# Patient Record
Sex: MALE | Race: WHITE | URBAN - METROPOLITAN AREA
[De-identification: names, ages, dates, MRNs, and addresses within clinical notes are randomized per-mention and may not be internally consistent; named-entity substitution may affect disease eponyms.]

---

## 2022-01-01 ENCOUNTER — HOSPITAL ENCOUNTER (EMERGENCY)
Age: 57
End: 2022-06-14
Attending: EMERGENCY MEDICINE | Admitting: EMERGENCY MEDICINE

## 2022-01-01 VITALS — SYSTOLIC BLOOD PRESSURE: 56 MMHG | DIASTOLIC BLOOD PRESSURE: 36 MMHG

## 2022-01-01 DIAGNOSIS — I46.9 CARDIAC ARREST (HCC): Primary | ICD-10-CM

## 2022-01-01 LAB
ALBUMIN SERPL-MCNC: 3.2 G/DL (ref 3.5–5)
ALBUMIN/GLOB SERPL: 1 {RATIO} (ref 1.1–2.2)
ALP SERPL-CCNC: 68 U/L (ref 45–117)
ALT SERPL-CCNC: 131 U/L (ref 12–78)
ANION GAP SERPL CALC-SCNC: 15 MMOL/L (ref 5–15)
AST SERPL-CCNC: 135 U/L (ref 15–37)
ATRIAL RATE: 39 BPM
BASE DEFICIT BLD-SCNC: 14 MMOL/L
BASOPHILS # BLD: 0 K/UL (ref 0–0.1)
BASOPHILS NFR BLD: 0 % (ref 0–1)
BILIRUB SERPL-MCNC: 0.5 MG/DL (ref 0.2–1)
BUN SERPL-MCNC: 21 MG/DL (ref 6–20)
BUN/CREAT SERPL: 15 (ref 12–20)
CA-I BLD-MCNC: 1.01 MMOL/L (ref 1.12–1.32)
CALCIUM SERPL-MCNC: 10.8 MG/DL (ref 8.5–10.1)
CALCULATED R AXIS, ECG10: -102 DEGREES
CALCULATED T AXIS, ECG11: -49 DEGREES
CHLORIDE BLD-SCNC: 113 MMOL/L (ref 100–108)
CHLORIDE SERPL-SCNC: 106 MMOL/L (ref 97–108)
CO2 BLD-SCNC: 20 MMOL/L (ref 19–24)
CO2 SERPL-SCNC: 20 MMOL/L (ref 21–32)
COMMENT, HOLDF: NORMAL
CREAT SERPL-MCNC: 1.43 MG/DL (ref 0.7–1.3)
CREAT UR-MCNC: 1.3 MG/DL (ref 0.6–1.3)
D DIMER PPP FEU-MCNC: 4.18 MG/L FEU (ref 0–0.65)
DIAGNOSIS, 93000: NORMAL
DIFFERENTIAL METHOD BLD: ABNORMAL
EOSINOPHIL # BLD: 0.6 K/UL (ref 0–0.4)
EOSINOPHIL NFR BLD: 6 % (ref 0–7)
ERYTHROCYTE [DISTWIDTH] IN BLOOD BY AUTOMATED COUNT: 12.7 % (ref 11.5–14.5)
GLOBULIN SER CALC-MCNC: 3.1 G/DL (ref 2–4)
GLUCOSE BLD STRIP.AUTO-MCNC: 224 MG/DL (ref 65–117)
GLUCOSE BLD STRIP.AUTO-MCNC: 233 MG/DL (ref 74–106)
GLUCOSE SERPL-MCNC: 257 MG/DL (ref 65–100)
HCO3 BLDA-SCNC: 18 MMOL/L
HCT VFR BLD AUTO: 48.9 % (ref 36.6–50.3)
HGB BLD-MCNC: 15.6 G/DL (ref 12.1–17)
IMM GRANULOCYTES # BLD AUTO: 0 K/UL
IMM GRANULOCYTES NFR BLD AUTO: 0 %
LACTATE BLD-SCNC: 6.13 MMOL/L (ref 0.4–2)
LYMPHOCYTES # BLD: 4.3 K/UL (ref 0.8–3.5)
LYMPHOCYTES NFR BLD: 44 % (ref 12–49)
MCH RBC QN AUTO: 29.8 PG (ref 26–34)
MCHC RBC AUTO-ENTMCNC: 31.9 G/DL (ref 30–36.5)
MCV RBC AUTO: 93.5 FL (ref 80–99)
MONOCYTES # BLD: 1 K/UL (ref 0–1)
MONOCYTES NFR BLD: 10 % (ref 5–13)
NEUTS SEG # BLD: 3.9 K/UL (ref 1.8–8)
NEUTS SEG NFR BLD: 40 % (ref 32–75)
NRBC # BLD: 0.03 K/UL (ref 0–0.01)
NRBC BLD-RTO: 0.3 PER 100 WBC
PCO2 BLDV: 71.3 MMHG (ref 41–51)
PH BLDV: 7.02 [PH] (ref 7.32–7.42)
PLATELET # BLD AUTO: 211 K/UL (ref 150–400)
PMV BLD AUTO: 11.1 FL (ref 8.9–12.9)
PO2 BLDV: 25 MMHG (ref 25–40)
POTASSIUM BLD-SCNC: 3.1 MMOL/L (ref 3.5–5.5)
POTASSIUM SERPL-SCNC: 3.6 MMOL/L (ref 3.5–5.1)
PROT SERPL-MCNC: 6.3 G/DL (ref 6.4–8.2)
Q-T INTERVAL, ECG07: 444 MS
QRS DURATION, ECG06: 96 MS
QTC CALCULATION (BEZET), ECG08: 502 MS
RBC # BLD AUTO: 5.23 M/UL (ref 4.1–5.7)
RBC MORPH BLD: ABNORMAL
SAMPLES BEING HELD,HOLD: NORMAL
SERVICE CMNT-IMP: ABNORMAL
SODIUM BLD-SCNC: 136 MMOL/L (ref 136–145)
SODIUM SERPL-SCNC: 141 MMOL/L (ref 136–145)
SPECIMEN SITE: ABNORMAL
TROPONIN-HIGH SENSITIVITY: 207 NG/L (ref 0–76)
VENTRICULAR RATE, ECG03: 77 BPM
WBC # BLD AUTO: 9.8 K/UL (ref 4.1–11.1)

## 2022-01-01 PROCEDURE — 99285 EMERGENCY DEPT VISIT HI MDM: CPT

## 2022-01-01 PROCEDURE — 74011250636 HC RX REV CODE- 250/636: Performed by: EMERGENCY MEDICINE

## 2022-01-01 PROCEDURE — 36415 COLL VENOUS BLD VENIPUNCTURE: CPT

## 2022-01-01 PROCEDURE — 96368 THER/DIAG CONCURRENT INF: CPT

## 2022-01-01 PROCEDURE — 82962 GLUCOSE BLOOD TEST: CPT

## 2022-01-01 PROCEDURE — 96365 THER/PROPH/DIAG IV INF INIT: CPT

## 2022-01-01 PROCEDURE — 96375 TX/PRO/DX INJ NEW DRUG ADDON: CPT

## 2022-01-01 PROCEDURE — 93005 ELECTROCARDIOGRAM TRACING: CPT

## 2022-01-01 PROCEDURE — 31500 INSERT EMERGENCY AIRWAY: CPT

## 2022-01-01 PROCEDURE — 92950 HEART/LUNG RESUSCITATION CPR: CPT

## 2022-01-01 PROCEDURE — 84484 ASSAY OF TROPONIN QUANT: CPT

## 2022-01-01 PROCEDURE — 85379 FIBRIN DEGRADATION QUANT: CPT

## 2022-01-01 PROCEDURE — 82947 ASSAY GLUCOSE BLOOD QUANT: CPT

## 2022-01-01 PROCEDURE — 37195 THROMBOLYTIC THERAPY STROKE: CPT

## 2022-01-01 PROCEDURE — 80053 COMPREHEN METABOLIC PANEL: CPT

## 2022-01-01 PROCEDURE — 92960 CARDIOVERSION ELECTRIC EXT: CPT

## 2022-01-01 PROCEDURE — 74011000250 HC RX REV CODE- 250: Performed by: EMERGENCY MEDICINE

## 2022-01-01 PROCEDURE — 85025 COMPLETE CBC W/AUTO DIFF WBC: CPT

## 2022-01-01 RX ORDER — LIDOCAINE HCL/PF 100 MG/5ML
SYRINGE (ML) INTRAVENOUS
Status: COMPLETED | OUTPATIENT
Start: 2022-01-01 | End: 2022-01-01

## 2022-01-01 RX ORDER — CALCIUM CHLORIDE INJECTION 100 MG/ML
INJECTION, SOLUTION INTRAVENOUS
Status: COMPLETED | OUTPATIENT
Start: 2022-01-01 | End: 2022-01-01

## 2022-01-01 RX ORDER — AMIODARONE HYDROCHLORIDE 150 MG/3ML
INJECTION, SOLUTION INTRAVENOUS
Status: COMPLETED | OUTPATIENT
Start: 2022-01-01 | End: 2022-01-01

## 2022-01-01 RX ORDER — NOREPINEPHRINE BITARTRATE/D5W 8 MG/250ML
PLASTIC BAG, INJECTION (ML) INTRAVENOUS
Status: COMPLETED | OUTPATIENT
Start: 2022-01-01 | End: 2022-01-01

## 2022-01-01 RX ORDER — ATROPINE SULFATE 0.1 MG/ML
INJECTION INTRAVENOUS
Status: COMPLETED | OUTPATIENT
Start: 2022-01-01 | End: 2022-01-01

## 2022-01-01 RX ORDER — MAGNESIUM SULFATE HEPTAHYDRATE 40 MG/ML
INJECTION, SOLUTION INTRAVENOUS
Status: COMPLETED | OUTPATIENT
Start: 2022-01-01 | End: 2022-01-01

## 2022-01-01 RX ORDER — EPINEPHRINE 0.1 MG/ML
INJECTION INTRACARDIAC; INTRAVENOUS
Status: COMPLETED | OUTPATIENT
Start: 2022-01-01 | End: 2022-01-01

## 2022-01-01 RX ORDER — SODIUM BICARBONATE 1 MEQ/ML
SYRINGE (ML) INTRAVENOUS
Status: COMPLETED | OUTPATIENT
Start: 2022-01-01 | End: 2022-01-01

## 2022-01-01 RX ADMIN — EPINEPHRINE 1 MG: 0.1 INJECTION INTRACARDIAC; INTRAVENOUS at 10:04

## 2022-01-01 RX ADMIN — MAGNESIUM SULFATE 2 G: 2 INJECTION INTRAVENOUS at 09:43

## 2022-01-01 RX ADMIN — EPINEPHRINE 1 MG: 0.1 INJECTION INTRACARDIAC; INTRAVENOUS at 10:49

## 2022-01-01 RX ADMIN — ATROPINE SULFATE 1 MG: 0.1 INJECTION PARENTERAL at 10:14

## 2022-01-01 RX ADMIN — EPINEPHRINE 1 MG: 0.1 INJECTION INTRACARDIAC; INTRAVENOUS at 09:57

## 2022-01-01 RX ADMIN — Medication 75 MCG/MIN: at 10:14

## 2022-01-01 RX ADMIN — EPINEPHRINE 1 MG: 0.1 INJECTION INTRACARDIAC; INTRAVENOUS at 10:33

## 2022-01-01 RX ADMIN — EPINEPHRINE 1 MG: 0.1 INJECTION INTRACARDIAC; INTRAVENOUS at 09:33

## 2022-01-01 RX ADMIN — CALCIUM CHLORIDE 1 G: 100 INJECTION, SOLUTION INTRAVENOUS; INTRAVENTRICULAR at 09:37

## 2022-01-01 RX ADMIN — SODIUM BICARBONATE 50 MEQ: 84 INJECTION, SOLUTION INTRAVENOUS at 09:35

## 2022-01-01 RX ADMIN — EPINEPHRINE 1 MG: 0.1 INJECTION INTRACARDIAC; INTRAVENOUS at 09:38

## 2022-01-01 RX ADMIN — EPINEPHRINE 1 MG: 0.1 INJECTION INTRACARDIAC; INTRAVENOUS at 09:42

## 2022-01-01 RX ADMIN — LIDOCAINE HYDROCHLORIDE 100 MG: 20 INJECTION, SOLUTION INTRAVENOUS at 09:58

## 2022-01-01 RX ADMIN — SODIUM BICARBONATE 50 MEQ: 84 INJECTION, SOLUTION INTRAVENOUS at 09:52

## 2022-01-01 RX ADMIN — SODIUM BICARBONATE 50 MEQ: 84 INJECTION, SOLUTION INTRAVENOUS at 10:36

## 2022-01-01 RX ADMIN — AMIODARONE HYDROCHLORIDE 150 MG: 50 INJECTION, SOLUTION INTRAVENOUS at 09:40

## 2022-01-01 RX ADMIN — ALTEPLASE 50 MG: KIT at 09:46

## 2022-01-01 RX ADMIN — SODIUM CHLORIDE 1000 ML: 900 INJECTION, SOLUTION INTRAVENOUS at 09:35

## 2022-06-14 NOTE — ED NOTES
Attempted calling [employer], nobody able to take the call at this time. Secure voicemail left. Awaiting to hear back.

## 2022-06-14 NOTE — PROGRESS NOTES
Staff for request for support to family in Room ER 15. Patient  @ 200 am today. Patient daughter Gloria Domingo, 2 other daughters, ex-spouse, cousin, and roommate were present at bedside. Condolences given to family. Provided grief support/counseling, ministry of [facility], [facility]; facilitated respect for spiritual/cultural practice during hospitalization -  prayer for sick and dying. Please contact Spiritual Care for any further referrals. Visited by: Frank Felipe, [address] [place] [address] paging Service [phone])

## 2022-06-14 NOTE — ED TRIAGE NOTES
Pt arrived via EMS for witnessed cardiac arrest. Pt found wheezing and hunched over around 0900, pt became pulseless and bystander CPR started 0902. Pt received 3 shocks, 200J in field. 3 rounds of epi, 300mg amio.

## 2022-06-14 NOTE — PROGRESS NOTES
Spiritual Care Assessment/Progress Note  [facility]      NAME: Jaja Mcdonough      MRN: [id number]  AGE: 61 y.o. SEX: male  Alevism Affiliation: No preference   Language: English     2022     Total Time (in minutes): 51     Spiritual Assessment begun in [address] through conversation with:         []Patient        [] Family    [] Friend(s)        Reason for Consult: Crisis, Stemi,Death, ER     Spiritual beliefs: (Please include comment if needed)     [] Identifies with a gadiel tradition:         [] Supported by a gadiel community:            [] Claims no spiritual orientation:           [] Seeking spiritual identity:                [] Adheres to an individual form of spirituality:           [x] Not able to assess:                           Identified resources for coping:      [] Prayer                               [] Music                  [] Guided Imagery     [] Family/friends                 [] Pet visits     [] Devotional reading                         [x] Unknown     [] Other:                                               Interventions offered during this visit: (See comments for more details)                Plan of Care:     [] Support spiritual and/or cultural needs    [] Support AMD and/or advance care planning process      [x] Support grieving process   [] Coordinate Rites and/or Rituals    [] Coordination with community clergy   [] No spiritual needs identified at this time   [] Detailed Plan of Care below (See Comments)  [] Make referral to Music Therapy  [] Make referral to Pet Therapy     [] Make referral to Addiction services  [] Make referral to [facility]  [] Make referral to Spiritual Care Partner  [] No future visits requested        [] Contact Spiritual Care for further referrals     Comments: Pager response to CODE STEMI in ER 15. Mr. Lorie Mercer  @ 36. There was no family present. 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  Daughter Kvng Grimes [phone] contacted by ER  Transportation arrangements being coordinated by ER SW to assist Susan in coming to hospital. She is unaware of patient's status. ER SW will contact Spiritual Care services for support upon Susan's arrival. Pause called at time of death. Provided ministry of presence, silent prayer and collaborated with and supported staff. Please contact Spiritual Care for any further referrals. Visited by: David Barajas.  [address] paging Service [phone])

## 2022-06-14 NOTE — ED NOTES
Cardiology at bedside preforming bedside ECHO. Noted RV movement with faint and thready femoral pulse.

## 2022-06-14 NOTE — ED PROVIDER NOTES
74-year-old male with unknown past medical history presents as a cardiac arrest unwitnessed in the field with 20 minutes of CPR in progress by EMS prior to arrival.  CPR was started after approximately 2 minutes of downtime by nurse in the parking garage. 3 rounds of epi and defibrillation in the field prior to arrival.  Patient arrived with [facility] airway in place. Per nurse who started CPR, who spoke with bystander, Mr. Aguillon was wheezing heavily and collapsed in parking garage 2 minutes prior to CPR being initiated. History reviewed. No pertinent past medical history. History reviewed. No pertinent surgical history. No family history on file. Social History     Socioeconomic History    Marital status: UNKNOWN     Spouse name: Not on file    Number of children: Not on file    Years of education: Not on file    Highest education level: Not on file   Occupational History    Not on file   Tobacco Use    Smoking status: Not on file    Smokeless tobacco: Not on file   Substance and Sexual Activity    Alcohol use: Not on file    Drug use: Not on file    Sexual activity: Not on file   Other Topics Concern    Not on file   Social History Narrative    Not on file     Social Determinants of Health     Financial Resource Strain:     Difficulty of Paying Living Expenses: Not on file   Food Insecurity:     Worried About Running Out of Food in the Last Year: Not on file    Jorge of Food in the Last Year: Not on file   Transportation Needs:     Lack of Transportation (Medical): Not on file    Lack of Transportation (Non-Medical):  Not on file   Physical Activity:     Days of Exercise per Week: Not on file    Minutes of Exercise per Session: Not on file   Stress:     Feeling of Stress : Not on file   Social Connections:     Frequency of Communication with Friends and Family: Not on file    Frequency of Social Gatherings with Friends and Family: Not on file    Attends Oriental orthodox Services: Not on file    Active Member of Clubs or Organizations: Not on file    Attends Club or Organization Meetings: Not on file    Marital Status: Not on file   Intimate Partner Violence:     Fear of Current or Ex-Partner: Not on file    Emotionally Abused: Not on file    Physically Abused: Not on file    Sexually Abused: Not on file   Housing Stability:     Unable to Pay for Housing in the Last Year: Not on file    Number of [place] in the Last Year: Not on file    Unstable Housing in the Last Year: Not on file         ALLERGIES: Patient has no allergy information on record. Review of Systems    Vitals:    06/14/22 1000 06/14/22 1003   BP: (!) 109/91 (!) 56/36            Physical Exam  Constitutional:       Appearance: He is well-developed. Comments: warm   HENT:      Head: Normocephalic and atraumatic. Pulmonary:      Comments: Good airway on arrival  Musculoskeletal:         General: Normal range of motion. Skin:     General: Skin is warm and dry. MDM  Number of Diagnoses or Management Options         Intubation    Date/Time: 6/14/2022 9:30 AM  Performed by: Chelle Sauceda MD  Authorized by: Chelle Sauceda MD     Consent:     Consent obtained:  Emergent situation  Pre-procedure details:     Patient status:  Unresponsive    Pretreatment medications:  None    Paralytics:  None  Procedure details:     CPR in progress: yes      Intubation method:  Oral    Oral intubation technique:  Direct    Laryngoscope blade:   Mac 3    Tube size (mm):  7.5    Tube type:  Cuffed    Number of attempts:  1    Ventilation between attempts: no      Cricoid pressure: yes      Tube visualized through cords: yes    Placement assessment:     ETT to teeth:  26    Tube secured with:  ETT quiñonez    Breath sounds:  Equal    Placement verification: chest rise, condensation, direct visualization and ETCO2 detector    Critical Care  Performed by: Chelle Sauceda MD  Authorized by: Chelle Sauceda MD Critical care provider statement:     Critical care start time:  6/14/2022 9:30 AM    Critical care end time:  6/14/2022 11:20 AM    Critical care was necessary to treat or prevent imminent or life-threatening deterioration of the following conditions:  Circulatory failure    Critical care was time spent personally by me on the following activities:  Ventilator management      Good airway dislodged on arrival.  Patient immediately intubated without medications. 7.5 cuffed tube with 1 attempt direct laryngoscopy with 3 MAC blade. Patient arrived in cardiac arrest with CPR in progress EMS. 3 times defibrillation in the field and 3 rounds of epinephrine in the field with no ROSC. Continued. In emergency department with intermittent thready pulse, not sustained. 930am start code in ER. TOD 1056am.    11:01 AM  Marvin Krishnamurthy MD spoke with Dr. Sunshine Oden, Consult for Cardiology. Discussed available diagnostic tests and clinical findings. He/She is in agreement with care plans as outlined. He/she evaluated patient at bedside and discussed further management with nonsustainable intermittent thready pulse after multiple rounds of epinephrine and various cardiac medications. Minimal activity on bedside echo with an EF of 10%, code time greater than 1 hour, pH 7.0, nonsustainable pulsenot amenable to Cath Lab or ECMO given very poor neurologic outcome. 11AM  Called daughter at home, Valene Severs, to come to the emergency department immediately. 12:01 PM  Informed family of Hank Sales's death.  at bedside with family. ED EKG interpretation:  Wide complex unknown rhythm and irregular. Rate (approx.): 77; Axis: left axis deviation; QRS interval: prolonged; ST/T wave: elevated ; Other findings: abnormal ekg. This EKG was interpreted by Marvin Krishnamurthy MD,ED Provider.

## 2022-06-14 NOTE — PROGRESS NOTES
6/14/2022; 10:40 -   CM notes that patient had a witnessed cardiac arrest.  Patient had CPR started and was brought to ED by EMS. CM was present with ED Attending when patient's phone was located and unlocked. Patient's phone is in [address], but recent contact for 'Alexis Barber' was located. ED Attending contacted individual, who identified self as patient's daughter and only local family. ED Attending gave clinical update and requested for family to proceed to the hospital.  CRM: Bryant Shepherd MPH, [address]; Z: [phone] or [phone]      10:50 -   Per Nursing Supervisor request, CM contacted family contact, who identified self as patient's 25 YO daughter Aurther Opoka: [phone]). The daughter is approximately 20 minutes from the hospital and is currently trying to have a friend bring her to the hospital.  CM identified ability to have a [employer] transport dispatched for the daughter, but the daughter's preference is to come with support. Patient's daughter is aware of ability to call [facility] ED to obtain transport. Medical Team, including Cardio and ED Attending, remain present at bedside. Resuscitation measures were stopped per Cardio. CRM: Bryant Shepherd MPH, [facility]; Z: [phone] or [phone]      11:30 -   CM was notified that patient's daughter is on site. CM paged [facility]. CM was present with ED Attending as patient's daughter was informed of his passing. CM remained with patient's daughter and  at bedside and offered emotional support and listening.  to coordinate for  presence with patient. Patient's daughter identified that her mother is en route. Additional individuals present include: patient's employer, the daughter's 6 and 10 YO sisters, and a male friend of the daughters.     CRM: Bryant Shepherd MPH, [address]; Z: [phone] or [phone]